# Patient Record
Sex: FEMALE | Race: WHITE | Employment: UNEMPLOYED | URBAN - METROPOLITAN AREA
[De-identification: names, ages, dates, MRNs, and addresses within clinical notes are randomized per-mention and may not be internally consistent; named-entity substitution may affect disease eponyms.]

---

## 2018-04-06 ENCOUNTER — HOSPITAL ENCOUNTER (EMERGENCY)
Age: 68
Discharge: HOME | End: 2018-04-06
Attending: EMERGENCY MEDICINE | Admitting: EMERGENCY MEDICINE
Payer: MEDICARE

## 2018-04-06 ENCOUNTER — APPOINTMENT (OUTPATIENT)
Dept: CT IMAGING | Age: 68
End: 2018-04-06
Attending: EMERGENCY MEDICINE
Payer: MEDICARE

## 2018-04-06 ENCOUNTER — APPOINTMENT (OUTPATIENT)
Dept: GENERAL RADIOLOGY | Age: 68
End: 2018-04-06
Attending: EMERGENCY MEDICINE
Payer: MEDICARE

## 2018-04-06 VITALS
OXYGEN SATURATION: 93 % | HEART RATE: 113 BPM | SYSTOLIC BLOOD PRESSURE: 113 MMHG | RESPIRATION RATE: 36 BRPM | DIASTOLIC BLOOD PRESSURE: 91 MMHG | TEMPERATURE: 98.1 F
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
## 2018-04-06 DIAGNOSIS — R41.82 ALTERED MENTAL STATUS, UNSPECIFIED ALTERED MENTAL STATUS TYPE: ICD-10-CM

## 2018-04-06 DIAGNOSIS — N30.00 ACUTE CYSTITIS WITHOUT HEMATURIA: Primary | ICD-10-CM

## 2018-04-06 LAB
ALBUMIN SERPL-MCNC: 4.6 G/DL (ref 3.4–5)
ALBUMIN/GLOB SERPL: 1.2 {RATIO} (ref 0.8–1.7)
ALP SERPL-CCNC: 140 U/L (ref 45–117)
ALT SERPL-CCNC: 19 U/L (ref 13–56)
AMPHET UR QL SCN: NEGATIVE
ANION GAP SERPL CALC-SCNC: 9 MMOL/L (ref 3–18)
APAP SERPL-MCNC: <2 UG/ML (ref 10–30)
APPEARANCE UR: CLEAR
AST SERPL-CCNC: 19 U/L (ref 15–37)
ATRIAL RATE: 104 BPM
BACTERIA URNS QL MICRO: ABNORMAL /HPF
BARBITURATES UR QL SCN: NEGATIVE
BASOPHILS # BLD: 0 K/UL (ref 0–0.1)
BASOPHILS NFR BLD: 0 % (ref 0–2)
BENZODIAZ UR QL: POSITIVE
BILIRUB SERPL-MCNC: 0.5 MG/DL (ref 0.2–1)
BILIRUB UR QL: NEGATIVE
BUN SERPL-MCNC: 15 MG/DL (ref 7–18)
BUN/CREAT SERPL: 21 (ref 12–20)
CALCIUM SERPL-MCNC: 9.7 MG/DL (ref 8.5–10.1)
CALCULATED R AXIS, ECG10: 102 DEGREES
CALCULATED T AXIS, ECG11: -49 DEGREES
CANNABINOIDS UR QL SCN: NEGATIVE
CHLORIDE SERPL-SCNC: 101 MMOL/L (ref 100–108)
CK MB CFR SERPL CALC: 5.4 % (ref 0–4)
CK MB SERPL-MCNC: 5 NG/ML (ref 5–25)
CK SERPL-CCNC: 92 U/L (ref 26–192)
CO2 SERPL-SCNC: 24 MMOL/L (ref 21–32)
COCAINE UR QL SCN: NEGATIVE
COLOR UR: YELLOW
CREAT SERPL-MCNC: 0.73 MG/DL (ref 0.6–1.3)
DIAGNOSIS, 93000: NORMAL
DIFFERENTIAL METHOD BLD: ABNORMAL
EOSINOPHIL # BLD: 0.5 K/UL (ref 0–0.4)
EOSINOPHIL NFR BLD: 8 % (ref 0–5)
EPITH CASTS URNS QL MICRO: ABNORMAL /LPF (ref 0–5)
ERYTHROCYTE [DISTWIDTH] IN BLOOD BY AUTOMATED COUNT: 14.1 % (ref 11.6–14.5)
GLOBULIN SER CALC-MCNC: 3.9 G/DL (ref 2–4)
GLUCOSE SERPL-MCNC: 100 MG/DL (ref 74–99)
GLUCOSE UR STRIP.AUTO-MCNC: NEGATIVE MG/DL
HCT VFR BLD AUTO: 36.3 % (ref 35–45)
HDSCOM,HDSCOM: ABNORMAL
HGB BLD-MCNC: 12.4 G/DL (ref 12–16)
HGB UR QL STRIP: NEGATIVE
INR PPP: 1.8 (ref 0.8–1.2)
KETONES UR QL STRIP.AUTO: 40 MG/DL
LEUKOCYTE ESTERASE UR QL STRIP.AUTO: ABNORMAL
LYMPHOCYTES # BLD: 1.2 K/UL (ref 0.9–3.6)
LYMPHOCYTES NFR BLD: 17 % (ref 21–52)
MCH RBC QN AUTO: 31.4 PG (ref 24–34)
MCHC RBC AUTO-ENTMCNC: 34.2 G/DL (ref 31–37)
MCV RBC AUTO: 91.9 FL (ref 74–97)
METHADONE UR QL: NEGATIVE
MONOCYTES # BLD: 0.7 K/UL (ref 0.05–1.2)
MONOCYTES NFR BLD: 10 % (ref 3–10)
NEUTS SEG # BLD: 4.7 K/UL (ref 1.8–8)
NEUTS SEG NFR BLD: 65 % (ref 40–73)
NITRITE UR QL STRIP.AUTO: NEGATIVE
OPIATES UR QL: POSITIVE
PCP UR QL: NEGATIVE
PH UR STRIP: 5.5 [PH] (ref 5–8)
PLATELET # BLD AUTO: 239 K/UL (ref 135–420)
PMV BLD AUTO: 9.5 FL (ref 9.2–11.8)
POTASSIUM SERPL-SCNC: 4.1 MMOL/L (ref 3.5–5.5)
PROT SERPL-MCNC: 8.5 G/DL (ref 6.4–8.2)
PROT UR STRIP-MCNC: NEGATIVE MG/DL
PROTHROMBIN TIME: 20 SEC (ref 11.5–15.2)
Q-T INTERVAL, ECG07: 332 MS
QRS DURATION, ECG06: 84 MS
QTC CALCULATION (BEZET), ECG08: 428 MS
RBC # BLD AUTO: 3.95 M/UL (ref 4.2–5.3)
RBC #/AREA URNS HPF: NEGATIVE /HPF (ref 0–5)
SALICYLATES SERPL-MCNC: <2.8 MG/DL (ref 2.8–20)
SODIUM SERPL-SCNC: 134 MMOL/L (ref 136–145)
SP GR UR REFRACTOMETRY: 1.01 (ref 1–1.03)
TROPONIN I SERPL-MCNC: <0.02 NG/ML (ref 0–0.04)
UROBILINOGEN UR QL STRIP.AUTO: 0.2 EU/DL (ref 0.2–1)
VENTRICULAR RATE, ECG03: 100 BPM
WBC # BLD AUTO: 7.1 K/UL (ref 4.6–13.2)
WBC URNS QL MICRO: ABNORMAL /HPF (ref 0–4)

## 2018-04-06 PROCEDURE — 80307 DRUG TEST PRSMV CHEM ANLYZR: CPT | Performed by: EMERGENCY MEDICINE

## 2018-04-06 PROCEDURE — 99285 EMERGENCY DEPT VISIT HI MDM: CPT

## 2018-04-06 PROCEDURE — 82550 ASSAY OF CK (CPK): CPT | Performed by: EMERGENCY MEDICINE

## 2018-04-06 PROCEDURE — 70450 CT HEAD/BRAIN W/O DYE: CPT

## 2018-04-06 PROCEDURE — 77030005563 HC CATH URETH INT MMGH -A

## 2018-04-06 PROCEDURE — 96360 HYDRATION IV INFUSION INIT: CPT

## 2018-04-06 PROCEDURE — 85610 PROTHROMBIN TIME: CPT | Performed by: EMERGENCY MEDICINE

## 2018-04-06 PROCEDURE — 74011250637 HC RX REV CODE- 250/637: Performed by: EMERGENCY MEDICINE

## 2018-04-06 PROCEDURE — 71045 X-RAY EXAM CHEST 1 VIEW: CPT

## 2018-04-06 PROCEDURE — 81001 URINALYSIS AUTO W/SCOPE: CPT | Performed by: EMERGENCY MEDICINE

## 2018-04-06 PROCEDURE — 85025 COMPLETE CBC W/AUTO DIFF WBC: CPT | Performed by: EMERGENCY MEDICINE

## 2018-04-06 PROCEDURE — 93005 ELECTROCARDIOGRAM TRACING: CPT

## 2018-04-06 PROCEDURE — 51701 INSERT BLADDER CATHETER: CPT

## 2018-04-06 PROCEDURE — 80053 COMPREHEN METABOLIC PANEL: CPT | Performed by: EMERGENCY MEDICINE

## 2018-04-06 PROCEDURE — 72125 CT NECK SPINE W/O DYE: CPT

## 2018-04-06 PROCEDURE — 74011250636 HC RX REV CODE- 250/636: Performed by: EMERGENCY MEDICINE

## 2018-04-06 RX ORDER — NITROFURANTOIN (MACROCRYSTALS) 100 MG/1
100 CAPSULE ORAL 4 TIMES DAILY
Qty: 20 CAP | Refills: 0 | Status: SHIPPED | OUTPATIENT
Start: 2018-04-06

## 2018-04-06 RX ORDER — NITROFURANTOIN (MACROCRYSTALS) 100 MG/1
100 CAPSULE ORAL 4 TIMES DAILY
Qty: 20 CAP | Refills: 0 | Status: SHIPPED | OUTPATIENT
Start: 2018-04-06 | End: 2018-04-06

## 2018-04-06 RX ORDER — NITROFURANTOIN MACROCRYSTALS 50 MG/1
100 CAPSULE ORAL ONCE
Status: COMPLETED | OUTPATIENT
Start: 2018-04-06 | End: 2018-04-06

## 2018-04-06 RX ADMIN — SODIUM CHLORIDE 1000 ML: 900 INJECTION, SOLUTION INTRAVENOUS at 06:29

## 2018-04-06 RX ADMIN — NITROFURANTOIN MACROCRYSTALS 100 MG: 50 CAPSULE ORAL at 07:55

## 2018-04-06 NOTE — ED TRIAGE NOTES
Pt arrived via EMS on stretcher for an unwitnessed fall 4/5/18 in AM.  Pt states she did not lose LOC. Pt is complaining of bilateral shoulder pain. Pt is constantly twitching with feet and BUE. Pt is complaining of numbness and tingling to BUE. Pt has extensive psychiatric medication and admits to taking all medication this evening to include 4 xanax. Pt has history of left femur fracture, jaw fracture, DM, depression, anxiety and intracranial hemorrage per EMS. Marked sensation with IV placement.

## 2018-04-06 NOTE — ED NOTES
Dr. Usha Parisi said o.k. To discharge pt home. Pt continues to behave agitated & uncooperative & confused at times. Dr. Usha Parisi reevaluated pt & said o.k. o discharge.

## 2018-04-06 NOTE — ED PROVIDER NOTES
EMERGENCY DEPARTMENT HISTORY AND PHYSICAL EXAM    5:09 AM      Date: 4/6/2018  Patient Name: Tyler Ray    History of Presenting Illness     Chief Complaint   Patient presents with   Olinda He Fall    Shoulder Pain    Altered mental status         History Provided By: Patient and EMS    Additional History (Context):     Tyler Ray is a 76 y.o. female with no pertinent history, presenting to the ED via EMS for evaluation after a fall, onset approx 25 hrs ago. Per EMS pt c/o bilat shoulder pain. Pt is unsure how many times she fell. HPI limited due to pt's AMS. PCP: None        Past History     Past Medical History:  No past medical history on file. Past Surgical History:  No past surgical history on file. Family History:  No family history on file. Social History:  Social History   Substance Use Topics    Smoking status: Not on file    Smokeless tobacco: Not on file    Alcohol use Not on file       Allergies:  No Known Allergies      Review of Systems       Review of Systems   Unable to perform ROS: Mental status change         Physical Exam     Visit Vitals    BP (!) (P) 133/109 (BP 1 Location: Left arm, BP Patient Position: At rest)    Pulse (!) (P) 108    Temp (P) 98.3 °F (36.8 °C)    Resp (P) 23    SpO2 93%         Physical Exam   Constitutional: She appears well-developed and well-nourished. Sleepy but arousable   HENT:   Head: Normocephalic and atraumatic. Eyes: Conjunctivae and EOM are normal. Right eye exhibits no discharge. Left eye exhibits no discharge. No scleral icterus. Neck: Normal range of motion. Neck supple. No tracheal deviation present. Cardiovascular: Normal rate, regular rhythm and normal heart sounds. No murmur heard. Pulmonary/Chest: Effort normal and breath sounds normal. No respiratory distress. She has no wheezes. She has no rales. Abdominal: Soft. She exhibits no distension. There is no tenderness. There is no rebound and no guarding. Musculoskeletal: Normal range of motion. She exhibits no edema or deformity. Neurological:   Sleepy but arousable. Incoherent speech    Skin: Skin is warm and dry. She is not diaphoretic. Diagnostic Study Results     Labs -  No results found for this or any previous visit (from the past 12 hour(s)). Radiologic Studies -   XR CHEST SNGL V   Final Result      CT SPINE CERV WO CONT   Final Result      CT HEAD WO CONT   Final Result        CT HEAD WO CONT   Impression:   1. No acute intracranial pathology. As read by the radiologist.    Medical Decision Making   I am the first provider for this patient. I reviewed the vital signs, available nursing notes, past medical history, past surgical history, family history and social history. Vital Signs-Reviewed the patient's vital signs. Pulse Oximetry Analysis -  98% on room air (Interpretation)    EKG: Interpreted by the EP. Time Interpreted: 0536   Rate: 100   Rhythm: Atrial Fibrillation    Interpretation: Q-waves in the lateral leads. Nonspecific T-wave abnormalities. no acute ST or T-wave changes suggestive of acute ischemia. Records Reviewed: Nursing Notes (Time of Review: 5:09 AM)    Provider Notes (Medical Decision Making): Pt with AMS and fall, care transitioned to Dr. Jeffrey Basurto. Diagnosis     Clinical Impression:   1. Acute cystitis without hematuria    2. Altered mental status, unspecified altered mental status type    3.  Fall, initial encounter        Disposition: pending    Follow-up Information     Follow up With Details Comments [address] [facility] Call in 2 days  [address] [facility] [id number] [phone]    [facility] EMERGENCY DEPT  As needed, If symptoms worsen [address] [place] [id number]  [phone]           Discharge Medication List as of 4/6/2018  9:59 AM      START taking these medications    Details   nitrofurantoin (MACRODANTIN) 100 mg capsule Take 1 Cap by mouth four (4) times daily. , Print, Disp-20 Cap, R-0           _______________________________    Attestations:  Scribe Attestation     Deepika Riojas acting as a scribe for and in the presence of Wayne Zayas MD     April 06, 2018 at 5:09 AM       Provider Attestation:      I personally performed the services described in the documentation, reviewed the documentation, as recorded by the scribe in my presence, and it accurately and completely records my words and actions.  April 06, 2018 at 5:09 AM - Wayne Zayas MD    _______________________________

## 2018-04-06 NOTE — ED NOTES
Pt sleeping on and off on stretcher with no acute distress noted and stable vital signs. Will continue to monitor pt and await further orders.

## 2018-04-06 NOTE — DISCHARGE INSTRUCTIONS
Urinary Tract Infection in Women: Care Instructions  Your Care Instructions    A urinary tract infection, or UTI, is a general term for an infection anywhere between the kidneys and the urethra (where urine comes out). Most UTIs are bladder infections. They often cause pain or burning when you urinate. UTIs are caused by bacteria and can be cured with antibiotics. Be sure to complete your treatment so that the infection goes away. Follow-up care is a key part of your treatment and safety. Be sure to make and go to all appointments, and call your doctor if you are having problems. It's also a good idea to know your test results and keep a list of the medicines you take. How can you care for yourself at home? · Take your antibiotics as directed. Do not stop taking them just because you feel better. You need to take the full course of antibiotics. · Drink extra water and other fluids for the next day or two. This may help wash out the bacteria that are causing the infection. (If you have kidney, heart, or liver disease and have to limit fluids, talk with your doctor before you increase your fluid intake.)  · Avoid drinks that are carbonated or have caffeine. They can irritate the bladder. · Urinate often. Try to empty your bladder each time. · To relieve pain, take a hot bath or lay a heating pad set on low over your lower belly or genital area. Never go to sleep with a heating pad in place. To prevent UTIs  · Drink plenty of water each day. This helps you urinate often, which clears bacteria from your system. (If you have kidney, heart, or liver disease and have to limit fluids, talk with your doctor before you increase your fluid intake.)  · Urinate when you need to. · Urinate right after you have sex. · Change sanitary pads often. · Avoid douches, bubble baths, feminine hygiene sprays, and other feminine hygiene products that have deodorants.   · After going to the bathroom, wipe from front to back.  When should you call for help? Call your doctor now or seek immediate medical care if:  ? · Symptoms such as fever, chills, nausea, or vomiting get worse or appear for the first time. ? · You have new pain in your back just below your rib cage. This is called flank pain. ? · There is new blood or pus in your urine. ? · You have any problems with your antibiotic medicine. ? Watch closely for changes in your health, and be sure to contact your doctor if:  ? · You are not getting better after taking an antibiotic for 2 days. ? · Your symptoms go away but then come back. Where can you learn more? Go to http://moiz-alicia.info/. Enter O133 in the search box to learn more about \"Urinary Tract Infection in Women: Care Instructions. \"  Current as of: May 12, 2017  Content Version: 11.4  © 2180-2576 [employer]. Care instructions adapted under license by [employer] (which disclaims liability or warranty for this information). If you have questions about a medical condition or this instruction, always ask your healthcare professional. [address] any warranty or liability for your use of this information. Preventing Falls: Care Instructions  Your Care Instructions    Getting around your home safely can be a challenge if you have injuries or health problems that make it easy for you to fall. Loose rugs and furniture in walkways are among the dangers for many older people who have problems walking or who have poor eyesight. People who have conditions such as arthritis, osteoporosis, or dementia also have to be careful not to fall. You can make your home safer with a few simple measures. Follow-up care is a key part of your treatment and safety. Be sure to make and go to all appointments, and call your doctor if you are having problems. It's also a good idea to know your test results and keep a list of the medicines you take.   How can you care for yourself at home? Taking care of yourself  · You may get dizzy if you do not drink enough water. To prevent dehydration, drink plenty of fluids, enough so that your urine is light yellow or clear like water. Choose water and other caffeine-free clear liquids. If you have kidney, heart, or liver disease and have to limit fluids, talk with your doctor before you increase the amount of fluids you drink. · Exercise regularly to improve your strength, muscle tone, and balance. Walk if you can. Swimming may be a good choice if you cannot walk easily. · Have your vision and hearing checked each year or any time you notice a change. If you have trouble seeing and hearing, you might not be able to avoid objects and could lose your balance. · Know the side effects of the medicines you take. Ask your doctor or pharmacist whether the medicines you take can affect your balance. Sleeping pills or sedatives can affect your balance. · Limit the amount of alcohol you drink. Alcohol can impair your balance and other senses. · Ask your doctor whether calluses or corns on your feet need to be removed. If you wear loose-fitting shoes because of calluses or corns, you can lose your balance and fall. · Talk to your doctor if you have numbness in your feet. Preventing falls at home  · Remove raised doorway thresholds, throw rugs, and clutter. Repair loose carpet or raised areas in the floor. · Move furniture and electrical cords to keep them out of walking paths. · Use nonskid floor wax, and wipe up spills right away, especially on ceramic tile floors. · If you use a walker or cane, put rubber tips on it. If you use crutches, clean the bottoms of them regularly with an abrasive pad, such as steel wool. · Keep your house well lit, especially [place], and outside walkways. Use night-lights in areas such as hallways and bathrooms.  Add extra light switches or use remote switches (such as switches that go on or off when you clap your hands) to make it easier to turn lights on if you have to get up during the night. · Install sturdy handrails on stairways. · Move items in your cabinets so that the things you use a lot are on the lower shelves (about waist level). · Keep a cordless phone and a flashlight with new batteries by your bed. If possible, put a phone in each of the main rooms of your house, or carry a cell phone in case you fall and cannot reach a phone. Or, you can wear a device around your neck or wrist. You push a button that sends a signal for help. · Wear low-heeled shoes that fit well and give your feet good support. Use footwear with nonskid soles. Check the heels and soles of your shoes for wear. Repair or replace worn heels or soles. · Do not wear socks without shoes on wood floors. · Walk on the grass when the sidewalks are slippery. If you live in an area that gets snow and ice in the winter, sprinkle salt on slippery steps and sidewalks. Preventing falls in the bath  · Install grab bars and nonskid mats inside and outside your shower or tub and near the toilet and sinks. · Use shower chairs and bath benches. · Use a hand-held shower head that will allow you to sit while showering. · Get into a tub or shower by putting the weaker leg in first. Get out of a tub or shower with your strong side first.  · Repair loose toilet seats and consider installing a raised toilet seat to make getting on and off the toilet easier. · Keep your bathroom door unlocked while you are in the shower. Where can you learn more? Go to http://moiz-alicia.info/. Enter 0476 79 69 71 in the search box to learn more about \"Preventing Falls: Care Instructions. \"  Current as of: May 12, 2017  Content Version: 11.4  © 0589-3120 [employer]. Care instructions adapted under license by [employer] (which disclaims liability or warranty for this information).  If you have questions about a medical condition or this instruction, always ask your healthcare professional. [place] [address] any warranty or liability for your use of this information.

## 2018-04-06 NOTE — ED NOTES
7:38 AM :Pt care assumed from Dr. Chandler Buckley , ED provider. Pt complaint(s), current treatment plan, progression and available diagnostic results have been discussed thoroughly. Rounding occurred: yes  Intended Disposition: TBD   Pending diagnostic reports and/or labs (please list):         7:38 AM: Re-evaluated pt. Pt is much more awake and back to her mental baseline. She admits to taking multiple drugs for her chronic pain. States she is from out of town and has only taken one dose of Macrodantin for UTI, reports she does not have a PCP here in town. Will give her another dose of abx and refer to [facility]. Pt is requesting additional pain meds, however she understands she came here altered and fell likely due to taking too many pain medications. She can f/u with PCP if more pain medications are needed. Recent Results (from the past 12 hour(s))   CBC WITH AUTOMATED DIFF    Collection Time: 04/06/18  5:17 AM   Result Value Ref Range    WBC 7.1 4.6 - 13.2 K/uL    RBC 3.95 (L) 4.20 - 5.30 M/uL    HGB 12.4 12.0 - 16.0 g/dL    HCT 36.3 35.0 - 45.0 %    MCV 91.9 74.0 - 97.0 FL    MCH 31.4 24.0 - 34.0 PG    MCHC 34.2 31.0 - 37.0 g/dL    RDW 14.1 11.6 - 14.5 %    PLATELET 075 991 - 358 K/uL    MPV 9.5 9.2 - 11.8 FL    NEUTROPHILS 65 40 - 73 %    LYMPHOCYTES 17 (L) 21 - 52 %    MONOCYTES 10 3 - 10 %    EOSINOPHILS 8 (H) 0 - 5 %    BASOPHILS 0 0 - 2 %    ABS. NEUTROPHILS 4.7 1.8 - 8.0 K/UL    ABS. LYMPHOCYTES 1.2 0.9 - 3.6 K/UL    ABS. MONOCYTES 0.7 0.05 - 1.2 K/UL    ABS. EOSINOPHILS 0.5 (H) 0.0 - 0.4 K/UL    ABS.  BASOPHILS 0.0 0.0 - 0.1 K/UL    DF AUTOMATED     METABOLIC PANEL, COMPREHENSIVE    Collection Time: 04/06/18  5:17 AM   Result Value Ref Range    Sodium 134 (L) 136 - 145 mmol/L    Potassium 4.1 3.5 - 5.5 mmol/L    Chloride 101 100 - 108 mmol/L    CO2 24 21 - 32 mmol/L    Anion gap 9 3.0 - 18 mmol/L    Glucose 100 (H) 74 - 99 mg/dL    BUN 15 7.0 - 18 MG/DL    Creatinine 0.73 0.6 - 1.3 MG/DL BUN/Creatinine ratio 21 (H) 12 - 20      GFR est AA >60 >60 ml/min/1.73m2    GFR est non-AA >60 >60 ml/min/1.73m2    Calcium 9.7 8.5 - 10.1 MG/DL    Bilirubin, total 0.5 0.2 - 1.0 MG/DL    ALT (SGPT) 19 13 - 56 U/L    AST (SGOT) 19 15 - 37 U/L    Alk.  phosphatase 140 (H) 45 - 117 U/L    Protein, total 8.5 (H) 6.4 - 8.2 g/dL    Albumin 4.6 3.4 - 5.0 g/dL    Globulin 3.9 2.0 - 4.0 g/dL    A-G Ratio 1.2 0.8 - 1.7     ACETAMINOPHEN    Collection Time: 04/06/18  5:17 AM   Result Value Ref Range    Acetaminophen level <2 (L) 10.0 - 30.0 ug/mL   PROTHROMBIN TIME + INR    Collection Time: 04/06/18  5:17 AM   Result Value Ref Range    Prothrombin time 20.0 (H) 11.5 - 15.2 sec    INR 1.8 (H) 0.8 - 1.2     SALICYLATE    Collection Time: 04/06/18  5:17 AM   Result Value Ref Range    Salicylate level <6.6 (L) 2.8 - 20.0 MG/DL   CARDIAC PANEL,(CK, CKMB & TROPONIN)    Collection Time: 04/06/18  5:17 AM   Result Value Ref Range    CK 92 26 - 192 U/L    CK - MB 5.0 (H) <3.6 ng/ml    CK-MB Index 5.4 (H) 0.0 - 4.0 %    Troponin-I, Qt. <0.02 0.0 - 0.045 NG/ML   EKG, 12 LEAD, INITIAL    Collection Time: 04/06/18  5:31 AM   Result Value Ref Range    Ventricular Rate 100 BPM    Atrial Rate 104 BPM    QRS Duration 84 ms    Q-T Interval 332 ms    QTC Calculation (Bezet) 428 ms    Calculated R Axis 102 degrees    Calculated T Axis -49 degrees    Diagnosis       Atrial fibrillation  Rightward axis  Septal infarct , age undetermined  Abnormal ECG  No previous ECGs available     URINALYSIS W/ RFLX MICROSCOPIC    Collection Time: 04/06/18  5:44 AM   Result Value Ref Range    Color YELLOW      Appearance CLEAR      Specific gravity 1.010 1.005 - 1.030      pH (UA) 5.5 5.0 - 8.0      Protein NEGATIVE  NEG mg/dL    Glucose NEGATIVE  NEG mg/dL    Ketone 40 (A) NEG mg/dL    Bilirubin NEGATIVE  NEG      Blood NEGATIVE  NEG      Urobilinogen 0.2 0.2 - 1.0 EU/dL    Nitrites NEGATIVE  NEG      Leukocyte Esterase TRACE (A) NEG     DRUG SCREEN, URINE Collection Time: 04/06/18  5:44 AM   Result Value Ref Range    BENZODIAZEPINES POSITIVE (A) NEG      BARBITURATES NEGATIVE  NEG      THC (TH-CANNABINOL) NEGATIVE  NEG      OPIATES POSITIVE (A) NEG      PCP(PHENCYCLIDINE) NEGATIVE  NEG      COCAINE NEGATIVE  NEG      AMPHETAMINES NEGATIVE  NEG      METHADONE NEGATIVE  NEG      HDSCOM (NOTE)    URINE MICROSCOPIC ONLY    Collection Time: 04/06/18  5:44 AM   Result Value Ref Range    WBC 4 to 10 0 - 4 /hpf    RBC NEGATIVE  0 - 5 /hpf    Epithelial cells 1+ 0 - 5 /lpf    Bacteria FEW (A) NEG /hpf       Problem List Items Addressed This Visit     None      Visit Diagnoses     Acute cystitis without hematuria    -  Primary    Relevant Medications    nitrofurantoin (MACRODANTIN) 100 mg capsule    Altered mental status, unspecified altered mental status type        Fall, initial encounter            Dispo: Discharged      Scribe Attestation     Poppy Fisher acting as a scribe for and in the presence of Yudelka Verdugo MD      April 06, 2018 at 7:41 AM       Provider Attestation:      I personally performed the services described in the documentation, reviewed the documentation, as recorded by the scribe in my presence, and it accurately and completely records my words and actions.  April 06, 2018 at 7:41 AM - Yudelka Verdugo MD

## 2018-04-06 NOTE — ED NOTES
Multiple failed IV attempts by RN after pt continued to move with needle sticks. Charge nurse assistance requested with IV placement.

## 2018-07-18 ENCOUNTER — OFFICE VISIT (OUTPATIENT)
Dept: UROLOGY | Age: 68
End: 2018-07-18

## 2018-07-18 VITALS
OXYGEN SATURATION: 88 % | DIASTOLIC BLOOD PRESSURE: 88 MMHG | HEIGHT: 69 IN | BODY MASS INDEX: 21.48 KG/M2 | HEART RATE: 87 BPM | SYSTOLIC BLOOD PRESSURE: 135 MMHG | WEIGHT: 145 LBS

## 2018-07-18 DIAGNOSIS — N39.0 RECURRENT UTI: Primary | ICD-10-CM

## 2018-07-18 LAB
BILIRUB UR QL STRIP: NEGATIVE
GLUCOSE UR-MCNC: NEGATIVE MG/DL
KETONES P FAST UR STRIP-MCNC: NEGATIVE MG/DL
PH UR STRIP: 6.5 [PH] (ref 4.6–8)
PROT UR QL STRIP: NEGATIVE
SP GR UR STRIP: 1.02 (ref 1–1.03)
UA UROBILINOGEN AMB POC: NORMAL (ref 0.2–1)
URINALYSIS CLARITY POC: CLEAR
URINALYSIS COLOR POC: YELLOW
URINE BLOOD POC: NEGATIVE
URINE LEUKOCYTES POC: NEGATIVE
URINE NITRITES POC: NEGATIVE

## 2018-07-18 RX ORDER — ALPRAZOLAM 0.25 MG/1
TABLET ORAL
COMMUNITY

## 2018-07-18 RX ORDER — TRAMADOL HYDROCHLORIDE 50 MG/1
TABLET ORAL
COMMUNITY
Start: 2018-07-13

## 2018-07-18 RX ORDER — LUBIPROSTONE 8 UG/1
CAPSULE, GELATIN COATED ORAL
COMMUNITY

## 2018-07-18 RX ORDER — MELOXICAM 15 MG/1
15 TABLET ORAL DAILY
COMMUNITY

## 2018-07-18 RX ORDER — BACLOFEN 10 MG/1
TABLET ORAL 3 TIMES DAILY
COMMUNITY

## 2018-07-18 RX ORDER — DICLOFENAC SODIUM 75 MG/1
75 TABLET, DELAYED RELEASE ORAL 2 TIMES DAILY
COMMUNITY

## 2018-07-18 RX ORDER — DULOXETIN HYDROCHLORIDE 60 MG/1
60 CAPSULE, DELAYED RELEASE ORAL DAILY
COMMUNITY

## 2018-07-18 RX ORDER — DILTIAZEM HYDROCHLORIDE 30 MG/1
TABLET, FILM COATED ORAL
COMMUNITY

## 2018-07-18 RX ORDER — ROPINIROLE 1 MG/1
TABLET, FILM COATED ORAL 3 TIMES DAILY
COMMUNITY

## 2018-07-18 RX ORDER — CITALOPRAM 40 MG/1
40 TABLET, FILM COATED ORAL DAILY
COMMUNITY

## 2018-07-18 RX ORDER — NITROFURANTOIN 25; 75 MG/1; MG/1
CAPSULE ORAL
Qty: 21 CAP | Refills: 5 | Status: SHIPPED | OUTPATIENT
Start: 2018-07-18

## 2018-07-18 RX ORDER — DIGOXIN 125 MCG
TABLET ORAL DAILY
COMMUNITY

## 2018-07-18 RX ORDER — LEVOTHYROXINE SODIUM 125 UG/1
TABLET ORAL
COMMUNITY

## 2018-07-18 RX ORDER — DICYCLOMINE HYDROCHLORIDE 20 MG/1
20 TABLET ORAL EVERY 6 HOURS
COMMUNITY

## 2018-07-18 RX ORDER — LACTULOSE 10 G/15ML
SOLUTION ORAL; RECTAL 3 TIMES DAILY
COMMUNITY

## 2018-07-18 NOTE — MR AVS SNAPSHOT
[address] [place] [address] 
[phone] Patient: Simran Baeza MRN: [id number] ZWV:[id number] Visit Information Date & Time Provider Department Dept. Phone Encounter #  
 7/18/2018  3:00 PM Radha Kaur, [facility] [place] [facility] [id number] Upcoming Health Maintenance Date Due Hepatitis C Screening 1950 DTaP/Tdap/Td series (1 - Tdap) 4/3/1971 BREAST CANCER SCRN MAMMOGRAM 4/3/2000 FOBT Q 1 YEAR AGE 50-75 4/3/2000 ZOSTER VACCINE AGE 60> 2/3/2010 GLAUCOMA SCREENING Q2Y 4/3/2015 Bone Densitometry (Dexa) Screening 4/3/2015 Pneumococcal 65+ Low/Medium Risk (1 of 2 - PCV13) 4/3/2015 MEDICARE YEARLY EXAM 4/6/2018 Influenza Age 5 to Adult 8/1/2018 Allergies as of 7/18/2018  Review Complete On: 4/6/2018 By: Adriana Montemayor No Known Allergies Current Immunizations  Never Reviewed No immunizations on file. Not reviewed this visit You Were Diagnosed With   
  
 Codes Comments Recurrent UTI    -  Primary ICD-10-CM: N39.0 ICD-9-CM: 599.0 Vitals BP Pulse Height(growth percentile) Weight(growth percentile) SpO2 BMI  
 135/88 (BP 1 Location: Left arm, BP Patient Position: Sitting) 87 5' 9\" (1.753 m) 145 lb (65.8 kg) (!) 88% 21.41 kg/m2 Smoking Status Current Every Day Smoker Vitals History BMI and BSA Data Body Mass Index Body Surface Area  
 21.41 kg/m 2 1.79 m 2 Preferred Pharmacy Pharmacy Name Phone [id number], [address] [facility] [facility] [phone] Your Updated Medication List  
  
   
This list is accurate as of 7/18/18  4:30 PM.  Always use your most recent med list.  
  
  
  
  
 ALPRAZolam 0.25 mg tablet Commonly known as:  Carletha Puller Take  by mouth. AMITIZA 8 mcg capsule Generic drug:  lubiPROStone Take  by mouth. baclofen 10 mg tablet Commonly known as:  LIORESAL Take  by mouth three (3) times daily. CeleXA 40 mg tablet Generic drug:  citalopram  
Take 40 mg by mouth daily. diclofenac EC 75 mg EC tablet Commonly known as:  VOLTAREN Take 75 mg by mouth two (2) times a day. dicyclomine 20 mg tablet Commonly known as:  BENTYL Take 20 mg by mouth every six (6) hours. digoxin 0.125 mg tablet Commonly known as:  LANOXIN Take  by mouth daily. dilTIAZem 30 mg tablet Commonly known as:  CARDIZEM Take  by mouth. DULoxetine 60 mg capsule Commonly known as:  CYMBALTA Take 60 mg by mouth daily. lactulose 10 gram/15 mL solution Commonly known as:  Ameya Quiver Take  by mouth three (3) times daily. levothyroxine 125 mcg tablet Commonly known as:  SYNTHROID Take  by mouth Daily (before breakfast). meloxicam 15 mg tablet Commonly known as:  MOBIC Take 15 mg by mouth daily. nitrofurantoin 100 mg capsule Commonly known as:  MACRODANTIN Take 1 Cap by mouth four (4) times daily. rOPINIRole 1 mg tablet Commonly known as:  Yulisa Limber Take  by mouth three (3) times daily. traMADol 50 mg tablet Commonly known as:  ULTRAM  
  
  
  
  
We Performed the Following AMB POC URINALYSIS DIP STICK AUTO W/O MICRO [72143 CPT(R)] CA ELISA,POST-VOID RES,US,NON-IMAGING [id number] CPT(R)] Patient Instructions Preventing Falls: Care Instructions Your Care Instructions Getting around your home safely can be a challenge if you have injuries or health problems that make it easy for you to fall. Loose rugs and furniture in walkways are among the dangers for many older people who have problems walking or who have poor eyesight. People who have conditions such as arthritis, osteoporosis, or dementia also have to be careful not to fall. You can make your home safer with a few simple measures. Follow-up care is a key part of your treatment and safety. Be sure to make and go to all appointments, and call your doctor if you are having problems. It's also a good idea to know your test results and keep a list of the medicines you take. How can you care for yourself at home? Taking care of yourself · You may get dizzy if you do not drink enough water. To prevent dehydration, drink plenty of fluids, enough so that your urine is light yellow or clear like water. Choose water and other caffeine-free clear liquids. If you have kidney, heart, or liver disease and have to limit fluids, talk with your doctor before you increase the amount of fluids you drink. · Exercise regularly to improve your strength, muscle tone, and balance. Walk if you can. Swimming may be a good choice if you cannot walk easily. · Have your vision and hearing checked each year or any time you notice a change. If you have trouble seeing and hearing, you might not be able to avoid objects and could lose your balance. · Know the side effects of the medicines you take. Ask your doctor or pharmacist whether the medicines you take can affect your balance. Sleeping pills or sedatives can affect your balance. · Limit the amount of alcohol you drink. Alcohol can impair your balance and other senses. · Ask your doctor whether calluses or corns on your feet need to be removed. If you wear loose-fitting shoes because of calluses or corns, you can lose your balance and fall. · Talk to your doctor if you have numbness in your feet. Preventing falls at home · Remove raised doorway thresholds, throw rugs, and clutter. Repair loose carpet or raised areas in the floor. · Move furniture and electrical cords to keep them out of walking paths. · Use nonskid floor wax, and wipe up spills right away, especially on ceramic tile floors. · If you use a walker or cane, put rubber tips on it.  If you use crutches, clean the bottoms of them regularly with an abrasive pad, such as steel wool. · Keep your house well lit, especially [place], and outside walkways. Use night-lights in areas such as hallways and bathrooms. Add extra light switches or use remote switches (such as switches that go on or off when you clap your hands) to make it easier to turn lights on if you have to get up during the night. · Install sturdy handrails on stairways. · Move items in your cabinets so that the things you use a lot are on the lower shelves (about waist level). · Keep a cordless phone and a flashlight with new batteries by your bed. If possible, put a phone in each of the main rooms of your house, or carry a cell phone in case you fall and cannot reach a phone. Or, you can wear a device around your neck or wrist. You push a button that sends a signal for help. · Wear low-heeled shoes that fit well and give your feet good support. Use footwear with nonskid soles. Check the heels and soles of your shoes for wear. Repair or replace worn heels or soles. · Do not wear socks without shoes on wood floors. · Walk on the grass when the sidewalks are slippery. If you live in an area that gets snow and ice in the winter, sprinkle salt on slippery steps and sidewalks. Preventing falls in the bath · Install grab bars and nonskid mats inside and outside your shower or tub and near the toilet and sinks. · Use shower chairs and bath benches. · Use a hand-held shower head that will allow you to sit while showering. · Get into a tub or shower by putting the weaker leg in first. Get out of a tub or shower with your strong side first. 
· Repair loose toilet seats and consider installing a raised toilet seat to make getting on and off the toilet easier. · Keep your bathroom door unlocked while you are in the shower. Where can you learn more? Go to http://moiz-alicia.info/. Enter [phone] in the search box to learn more about \"Preventing Falls: Care Instructions. \" Current as of: May 12, 2017 Content Version: 11.7 © 9105-0706 [employer]. Care instructions adapted under license by [employer] (which disclaims liability or warranty for this information). If you have questions about a medical condition or this instruction, always ask your healthcare professional. [address] any warranty or liability for your use of this information. Introducing \[facility]\"" & HEALTH SERVICES! Lobo Singh introduces [employer] patient portal. Now you can access parts of your medical record, email your doctor's office, and request medication refills online. 1. In your internet browser, go to [email or web address] 2. Click on the First Time User? Click Here link in the Sign In box. You will see the New Member Sign Up page. 3. Enter your [employer] Access Code exactly as it appears below. You will not need to use this code after youve completed the sign-up process. If you do not sign up before the expiration date, you must request a new code. · [employer] Access Code: [email or web address] Expires: 10/16/2018  4:30 PM 
 
4. Enter the last four digits of your Social Security Number (xxxx) and Date of Birth (mm/dd/yyyy) as indicated and click Submit. You will be taken to the next sign-up page. 5. Create a [employer] ID. This will be your [employer] login ID and cannot be changed, so think of one that is secure and easy to remember. 6. Create a [employer] password. You can change your password at any time. 7. Enter your Password Reset Question and Answer. This can be used at a later time if you forget your password. 8. Enter your e-mail address. You will receive e-mail notification when new information is available in [address] 9. Click Sign Up. You can now view and download portions of your medical record. 10. Click the Download Summary menu link to download a portable copy of your medical information. If you have questions, please visit the Frequently Asked Questions section of the [employer] website. Remember, [employer] is NOT to be used for urgent needs. For medical emergencies, dial 911. Now available from your iPhone and Android! Please provide this summary of care documentation to your next provider. Your primary care clinician is listed as [facility]. If you have any questions after today's visit, please call [phone].

## 2018-07-18 NOTE — PATIENT INSTRUCTIONS

## 2018-07-18 NOTE — PROGRESS NOTES
Gracie Pablo    Chief Complaint   Patient presents with    New Patient    Recurrent UTI       History and Physical    Patient is a pleasant 80-year-old female  who presents with a long history of urinary tract infections. The patient states that her mother advised her that she was sickly as a baby and as a toddler and young child she would have recurrent lower tract irritative symptoms and receive antibiotics. She was never hospitalized for these episodes and never had any flank pain. It was purely lower tract. The patient had an uneventful puberty and had no difficulties throughout her young  life except for random episodes of honeymoon cystitis. The patient did not have difficulties until recent years and feels that this came on along with some idiopathic neuropathy, osteoarthritis, and irritable bowel syndrome. She will get episodes of abrupt onset of burning or there are other times when she will seem to get confused and her daughter has taken her to an emergency facility and urinary infection has been found. She has responded well to [facility] in the past.  She currently is finishing up a prescription for Macrobid. The patient does not have any gas bubbles in her urine and has not passed any particulate matter in her urine. She does not have any flank pain or fevers with these episodes. She will have occasional random episodes of urinary incontinence and will wear a diaper for its purposes and only needs to use one a day. He does not have nocturia or nocturnal enuresis. She does consume only 36 ounces of caffeinated Pepsi during the day    The patient underwent hysterectomy in 1978 for endometriosis. Past Medical History:   Diagnosis Date    [facility]fib ([facility] [address]     Anemia     Depression     Thyroid disease      There is no problem list on file for this patient.     Past Surgical History:   Procedure Laterality Date    HX HYSTERECTOMY      HX ORTHOPAEDIC      spinal fusion  HX ORTHOPAEDIC      right hip 2017  left hip 2016     Current Outpatient Prescriptions   Medication Sig Dispense Refill    diclofenac EC (VOLTAREN) 75 mg EC tablet Take 75 mg by mouth two (2) times a day.  lubiPROStone (AMITIZA) 8 mcg capsule Take  by mouth.  lactulose (CHRONULAC) 10 gram/15 mL solution Take  by mouth three (3) times daily.  levothyroxine (SYNTHROID) 125 mcg tablet Take  by mouth Daily (before breakfast).  dilTIAZem (CARDIZEM) 30 mg tablet Take  by mouth.  citalopram (CELEXA) 40 mg tablet Take 40 mg by mouth daily.  ALPRAZolam (XANAX) 0.25 mg tablet Take  by mouth.  dicyclomine (BENTYL) 20 mg tablet Take 20 mg by mouth every six (6) hours.  meloxicam (MOBIC) 15 mg tablet Take 15 mg by mouth daily.  rOPINIRole (REQUIP) 1 mg tablet Take  by mouth three (3) times daily.  digoxin (LANOXIN) 0.125 mg tablet Take  by mouth daily.  baclofen (LIORESAL) 10 mg tablet Take  by mouth three (3) times daily.  DULoxetine (CYMBALTA) 60 mg capsule Take 60 mg by mouth daily.  traMADol (ULTRAM) 50 mg tablet       nitrofurantoin (MACRODANTIN) 100 mg capsule Take 1 Cap by mouth four (4) times daily. 20 Cap 0     No Known Allergies  Social History     Social History    Marital status:      Spouse name: N/A    Number of children: N/A    Years of education: N/A     Occupational History    Not on file.      Social History Main Topics    Smoking status: Current Every Day Smoker     Packs/day: 0.25    Smokeless tobacco: Never Used    Alcohol use Yes    Drug use: Not on file    Sexual activity: Not on file     Other Topics Concern    Not on file     Social History Narrative    No narrative on file      Family History   Problem Relation Age of Onset    Dementia Mother     Heart Disease Father     Cancer Brother      rare luekemia              Visit Vitals    /88 (BP 1 Location: Left arm, BP Patient Position: Sitting)    Pulse 87  Ht 5' 9\" (1.753 m)    Wt 145 lb (65.8 kg)    SpO2 (!) 88%    BMI 21.41 kg/m2     Physical        Gen: WDWN adult NAD  Head  : normocephalic,  Normal ROM; eyes without normal pupils, EOMs, no masses;  conjunctiva normal  Neck: normal movement,  no evident mass,  No evident adenopathy, trachea midline,    Abd :bowel sounds normal, no masses, tenderness, organomegaly  Flanks  negative to punch percussion  -introitus shows atrophic change. Urethral meatus normal.  Urethra palpates normally. Bimanual palpation of the bladder unremarkable    Extremities- no edema, arthritis, deformity, swelling  Psych- oriented, no evident anxiety, no cognitive impairment evident    Urine is negative  Bladder scan reveals 176 cc residual                  Impression/ PLAN  Menopausal onset of urinary irritative symptoms manifest by dysuria or by cognitive alteration  Past history of urinary tract infections  Atrophic urethral vaginitis  High post void residual    Plan:  Fully discussed with the patient. For now, I do not want to address the residual urine because any medication might cause some issues with orthostasis  Cranberry suppression  Interventional Macrobid  Fingertip application of hormone cream.  Fully discussed and patient will maintain this on a nightly basis for at least a 3 month period of time    The patient is also advised that she might have a breakthrough infection with a resistant organism. She has persistent symptoms after interventional antibiotics I want her to come to this office and get a proper urine culture done            This visit exceeded *30minutes and >50% was counselling  The patient understands the discussion and plan    PLEASE NOTE:      This document has been produced using voice recognition software.   Unrecognized errors in transcription may be present    Shivani Stallings MD

## 2018-07-18 NOTE — PROGRESS NOTES
Ms. Jenna Fair has a reminder for a \"due or due soon\" health maintenance. I have asked that she contact her primary care provider for follow-up on this health maintenance.

## 2018-07-19 ENCOUNTER — DOCUMENTATION ONLY (OUTPATIENT)
Dept: UROLOGY | Age: 68
End: 2018-07-19

## 2018-07-19 NOTE — PROGRESS NOTES
Mrs. Porter Heading prescription was changed to Estrace cream 0.01% Apply finger tip amount to bladder opening at bed time with 1 refill. She also has Macrodantin with 5 refills at the Pharmacy. Her Daughter Darreld Bernice was in informed of this change.

## 2018-08-15 ENCOUNTER — DOCUMENTATION ONLY (OUTPATIENT)
Dept: UROLOGY | Age: 68
End: 2018-08-15

## 2018-08-15 NOTE — PROGRESS NOTES
Patient's daughter, Darryn Montalvo, called the office yesterday afternoon around 4:24PM.  She stated her mom started to have some burning with urination yesterday. She stated that Dr. Santos Mom had given her mom some Macrobid to keep on hand so that she could just take them when she felt an infection coming on and not have to go to the ER, as she has recurrent UTIs and has gone to the ER multiple times for it. Patient's daughter states she did take one Macrobid yesterday. Patient's daughter says she is calling because her mother seems very confused and thinks it might be from the infection. Patient's daughter denies the patient have any fever. Appointment was given to patient for today at 9:15am. She was instructed if she developed a fever overnight to take her to the ER. Patient's daughter verbalized understanding.